# Patient Record
Sex: MALE | Race: WHITE
[De-identification: names, ages, dates, MRNs, and addresses within clinical notes are randomized per-mention and may not be internally consistent; named-entity substitution may affect disease eponyms.]

---

## 2023-03-02 PROBLEM — Z00.00 ENCOUNTER FOR PREVENTIVE HEALTH EXAMINATION: Status: ACTIVE | Noted: 2023-03-02

## 2023-03-03 ENCOUNTER — NON-APPOINTMENT (OUTPATIENT)
Age: 43
End: 2023-03-03

## 2023-03-03 ENCOUNTER — APPOINTMENT (OUTPATIENT)
Dept: UROLOGY | Facility: CLINIC | Age: 43
End: 2023-03-03
Payer: COMMERCIAL

## 2023-03-03 VITALS
HEART RATE: 92 BPM | SYSTOLIC BLOOD PRESSURE: 119 MMHG | HEIGHT: 70 IN | BODY MASS INDEX: 32.21 KG/M2 | TEMPERATURE: 98.3 F | WEIGHT: 225 LBS | DIASTOLIC BLOOD PRESSURE: 81 MMHG

## 2023-03-03 DIAGNOSIS — Z30.09 ENCOUNTER FOR OTHER GENERAL COUNSELING AND ADVICE ON CONTRACEPTION: ICD-10-CM

## 2023-03-03 PROCEDURE — 99204 OFFICE O/P NEW MOD 45 MIN: CPT | Mod: 57

## 2023-03-03 NOTE — ASSESSMENT
[FreeTextEntry1] : vasecotmy eval\par Options for contraception, including oral contraceptive pills, condoms, intrauterine device, tubal ligation among others, were discussed at length today.\par \par He understands that vasectomy is considered a permanent procedure although it can be reversed with a complex microsurgical vasectomy reversal.  In addition, postprocedure pain was discussed as was a very rare failure rate of vasectomy. Risk of hematoma formation and very rare injury to the testicular artery was discussed. Infection risk was discussed as well.   He understands that he is still considered fertile until a semen analysis demonstrates azoospermia. He also understands that he needs to continue using contraception until azoospermia is verified following his vasectomy. The 30 day waiting period mandated by [facility] was discussed as well.\par will schedule\par

## 2023-03-03 NOTE — PHYSICAL EXAM
[Urethral Meatus] : meatus normal [Penis Abnormality] : normal uncircumcised penis [Urinary Bladder Findings] : the bladder was normal on palpation [Scrotum] : the scrotum was normal [Epididymis] : the epididymides were normal [Testes Tenderness] : no tenderness of the testes [Testes Mass (___cm)] : there were no testicular masses [FreeTextEntry1] : easily palp vas

## 2023-03-03 NOTE — LETTER BODY
[Dear  ___] : Dear  [unfilled], [FreeTextEntry2] : Dann Saunders MD\par [facility]\par [address]\[place] [address] [FreeTextEntry1] : I had the pleasure of seeing your patient,  LIZZ REDD, a 42 year old man, in the office in consultation today. Please see the attached note for full details.\par \par Thank you very much for allowing me to participate in the care of this patient. If you have any questions please feel free to call me at any time. \par \par \par Sincerely yours,\par \par \par \par Rodriguez Olsen MD, PATRICIO\par Director, Male Fertility and Microsurgery\par  of Urology\par [facility]\par

## 2023-03-03 NOTE — HISTORY OF PRESENT ILLNESS
[FreeTextEntry1] : 42M 3 kids  2 year and 21 year\par wife is 43\par she became pregnant with IUD in place\par no penile or testiuclar pain\par no ED\par no EjD\par

## 2023-04-25 RX ORDER — CEPHALEXIN 500 MG/1
500 CAPSULE ORAL
Qty: 3 | Refills: 0 | Status: ACTIVE | COMMUNITY
Start: 2023-04-25 | End: 1900-01-01

## 2023-04-25 RX ORDER — ACETAMINOPHEN AND CODEINE 300; 30 MG/1; MG/1
300-30 TABLET ORAL
Qty: 4 | Refills: 0 | Status: ACTIVE | COMMUNITY
Start: 2023-04-25 | End: 1900-01-01

## 2023-04-27 ENCOUNTER — APPOINTMENT (OUTPATIENT)
Dept: UROLOGY | Facility: CLINIC | Age: 43
End: 2023-04-27
Payer: COMMERCIAL

## 2023-04-27 VITALS — TEMPERATURE: 97.9 F | HEART RATE: 83 BPM | SYSTOLIC BLOOD PRESSURE: 118 MMHG | DIASTOLIC BLOOD PRESSURE: 77 MMHG

## 2023-04-27 PROCEDURE — 55250 REMOVAL OF SPERM DUCT(S): CPT
